# Patient Record
Sex: MALE | Race: WHITE | NOT HISPANIC OR LATINO | Employment: OTHER | URBAN - METROPOLITAN AREA
[De-identification: names, ages, dates, MRNs, and addresses within clinical notes are randomized per-mention and may not be internally consistent; named-entity substitution may affect disease eponyms.]

---
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
## 2021-05-17 ENCOUNTER — OFFICE VISIT (OUTPATIENT)
Dept: URGENT CARE | Facility: CLINIC | Age: 21
End: 2021-05-17
Payer: COMMERCIAL

## 2021-05-17 VITALS
HEIGHT: 67 IN | RESPIRATION RATE: 18 BRPM | OXYGEN SATURATION: 99 % | WEIGHT: 148 LBS | SYSTOLIC BLOOD PRESSURE: 104 MMHG | BODY MASS INDEX: 23.23 KG/M2 | HEART RATE: 85 BPM | DIASTOLIC BLOOD PRESSURE: 70 MMHG | TEMPERATURE: 98 F

## 2021-05-17 DIAGNOSIS — L03.012 PARONYCHIA OF LEFT THUMB: ICD-10-CM

## 2021-05-17 DIAGNOSIS — L03.012 CELLULITIS OF THUMB, LEFT: ICD-10-CM

## 2021-05-17 DIAGNOSIS — L03.011 CELLULITIS OF THUMB, RIGHT: Primary | ICD-10-CM

## 2021-05-17 PROCEDURE — 99214 OFFICE O/P EST MOD 30 MIN: CPT | Mod: S$GLB,,, | Performed by: PHYSICIAN ASSISTANT

## 2021-05-17 PROCEDURE — 99214 PR OFFICE/OUTPT VISIT, EST, LEVL IV, 30-39 MIN: ICD-10-PCS | Mod: S$GLB,,, | Performed by: PHYSICIAN ASSISTANT

## 2021-05-17 RX ORDER — MUPIROCIN 20 MG/G
OINTMENT TOPICAL 3 TIMES DAILY
Qty: 22 G | Refills: 0 | Status: SHIPPED | OUTPATIENT
Start: 2021-05-17 | End: 2021-05-22

## 2021-05-17 RX ORDER — SULFAMETHOXAZOLE AND TRIMETHOPRIM 800; 160 MG/1; MG/1
1 TABLET ORAL 2 TIMES DAILY
Qty: 14 TABLET | Refills: 0 | Status: SHIPPED | OUTPATIENT
Start: 2021-05-17 | End: 2021-05-24

## 2021-11-17 ENCOUNTER — OFFICE VISIT (OUTPATIENT)
Dept: URGENT CARE | Facility: CLINIC | Age: 21
End: 2021-11-17
Payer: COMMERCIAL

## 2021-11-17 VITALS
RESPIRATION RATE: 16 BRPM | SYSTOLIC BLOOD PRESSURE: 112 MMHG | BODY MASS INDEX: 21.19 KG/M2 | HEIGHT: 67 IN | HEART RATE: 72 BPM | TEMPERATURE: 99 F | OXYGEN SATURATION: 98 % | DIASTOLIC BLOOD PRESSURE: 68 MMHG | WEIGHT: 135 LBS

## 2021-11-17 DIAGNOSIS — R10.9 ABDOMINAL CRAMPING: Primary | ICD-10-CM

## 2021-11-17 PROCEDURE — 99213 PR OFFICE/OUTPT VISIT, EST, LEVL III, 20-29 MIN: ICD-10-PCS | Mod: S$GLB,,, | Performed by: PHYSICIAN ASSISTANT

## 2021-11-17 PROCEDURE — 99213 OFFICE O/P EST LOW 20 MIN: CPT | Mod: S$GLB,,, | Performed by: PHYSICIAN ASSISTANT

## 2021-11-17 RX ORDER — DICYCLOMINE HYDROCHLORIDE 10 MG/1
10 CAPSULE ORAL 3 TIMES DAILY
Qty: 21 CAPSULE | Refills: 0 | Status: SHIPPED | OUTPATIENT
Start: 2021-11-17 | End: 2021-11-24

## 2021-12-14 ENCOUNTER — OFFICE VISIT (OUTPATIENT)
Dept: UROLOGY | Facility: CLINIC | Age: 21
End: 2021-12-14
Payer: COMMERCIAL

## 2021-12-14 VITALS — BODY MASS INDEX: 21.19 KG/M2 | WEIGHT: 135 LBS | HEIGHT: 67 IN

## 2021-12-14 DIAGNOSIS — M54.50 ACUTE BILATERAL LOW BACK PAIN WITHOUT SCIATICA: Primary | ICD-10-CM

## 2021-12-14 PROCEDURE — 99203 PR OFFICE/OUTPT VISIT, NEW, LEVL III, 30-44 MIN: ICD-10-PCS | Mod: S$GLB,,, | Performed by: UROLOGY

## 2021-12-14 PROCEDURE — 99999 PR PBB SHADOW E&M-EST. PATIENT-LVL III: CPT | Mod: PBBFAC,,, | Performed by: UROLOGY

## 2021-12-14 PROCEDURE — 99203 OFFICE O/P NEW LOW 30 MIN: CPT | Mod: S$GLB,,, | Performed by: UROLOGY

## 2021-12-14 PROCEDURE — 99999 PR PBB SHADOW E&M-EST. PATIENT-LVL III: ICD-10-PCS | Mod: PBBFAC,,, | Performed by: UROLOGY

## 2023-08-23 ENCOUNTER — PATIENT MESSAGE (OUTPATIENT)
Dept: NEUROLOGY | Facility: CLINIC | Age: 23
End: 2023-08-23

## 2023-08-23 ENCOUNTER — OFFICE VISIT (OUTPATIENT)
Dept: NEUROLOGY | Facility: CLINIC | Age: 23
End: 2023-08-23
Payer: COMMERCIAL

## 2023-08-23 VITALS
BODY MASS INDEX: 22.63 KG/M2 | HEIGHT: 67 IN | SYSTOLIC BLOOD PRESSURE: 120 MMHG | HEART RATE: 90 BPM | WEIGHT: 144.19 LBS | DIASTOLIC BLOOD PRESSURE: 78 MMHG

## 2023-08-23 DIAGNOSIS — R56.9 SEIZURE: Primary | ICD-10-CM

## 2023-08-23 PROCEDURE — 99205 PR OFFICE/OUTPT VISIT, NEW, LEVL V, 60-74 MIN: ICD-10-PCS | Mod: S$GLB,,, | Performed by: PSYCHIATRY & NEUROLOGY

## 2023-08-23 PROCEDURE — 99999 PR PBB SHADOW E&M-EST. PATIENT-LVL III: ICD-10-PCS | Mod: PBBFAC,,, | Performed by: PSYCHIATRY & NEUROLOGY

## 2023-08-23 PROCEDURE — 99205 OFFICE O/P NEW HI 60 MIN: CPT | Mod: S$GLB,,, | Performed by: PSYCHIATRY & NEUROLOGY

## 2023-08-23 PROCEDURE — 99999 PR PBB SHADOW E&M-EST. PATIENT-LVL III: CPT | Mod: PBBFAC,,, | Performed by: PSYCHIATRY & NEUROLOGY

## 2023-08-23 RX ORDER — DIAZEPAM 5 MG/1
5 TABLET ORAL EVERY 12 HOURS PRN
Qty: 10 TABLET | Refills: 0 | Status: SHIPPED | OUTPATIENT
Start: 2023-08-23

## 2023-08-23 NOTE — PROGRESS NOTES
[facility] NEUROLOGY [address] [facility], [place]    Date: August 23, 2023   Patient Name: Hao Patterson   MRN: [id number]   PCP: Stefanie Groves  Referring Provider: Self, Aaareferral    Assessment:      This is Hao Patterson, 23 y.o. male with cerebral palsy       Plan:      -  No indications for seizure medication at this time.       Greater than 60 minutes spent in chart review, documentation, independent review of imaging, and face to face time with patient    I discussed side effects of the medications. I asked the patient to stop the medication if he notices serious adverse effects as we discussed and to seek immediate medical attention at an ER.     Alex Torre MD  [facility]   Department of Neurology    Subjective:        HPI:   Mr. Hao Patterson is a 23 y.o. male who presents with a chief complaint of work clearance, presents with wife who corroborates history    Patient has cerebral palsy and had initial episode of about a minute lip twitching without alteration of awareness when he was [age] years old and was started on PHB.  These continued every several months while on PHB, all consisting of focal motor activity of the face only without alteration of awareness.  The last time this occurred was at approximately age [age] and he has had no recurrence since stopping.  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    PAST MEDICAL HISTORY:  Past Medical History:   Diagnosis Date    Cerebral palsy     GERD (gastroesophageal reflux disease)     Seizures        PAST SURGICAL HISTORY:  No past surgical history on file.    CURRENT MEDS:  Current Outpatient Medications   Medication Sig Dispense Refill    diazePAM (VALIUM) 5 MG tablet Take 1 tablet (5 mg total) by mouth every 12 (twelve) hours as needed for Anxiety. 10 tablet 0    famotidine (PEPCID) 20 MG tablet Take 1 tablet (20 mg total) by mouth 2 (two) times daily.  "(Patient not taking: Reported on 12/22/2019) 30 tablet 0    fluticasone (FLONASE) 50 mcg/actuation nasal spray SHAKE LQ AND U 1 SPR IEN QD  6    mupirocin (BACTROBAN) 2 % ointment Apply to affected area 3 times daily (Patient not taking: Reported on 8/23/2023) 22 g 1    pantoprazole (PROTONIX) 40 MG tablet Take 40 mg by mouth once daily.       No current facility-administered medications for this visit.       ALLERGIES:  Review of patient's allergies indicates:   Allergen Reactions    Nut - unspecified Other (See Comments)     Never had reaction but tested + for the allergy: peanuts    Nuts [tree nut]        FAMILY HISTORY:  Family History   Problem Relation Age of Onset    Cancer Father        SOCIAL HISTORY:  Social History     Tobacco Use    Smoking status: Never    Smokeless tobacco: Never   Substance Use Topics    Alcohol use: No    Drug use: No       Review of Systems:  12 review of systems is negative except for the symptoms mentioned in HPI.        Objective:     Vitals:    08/23/23 0907   BP: 120/78   BP Location: Left arm   Patient Position: Sitting   BP Method: Large (Manual)   Pulse: 90   Weight: 65.4 kg (144 lb 2.9 oz)   Height: 5' 7" (1.702 m)       General: NAD, well nourished   Eyes: no tearing, discharge, no erythema   ENT: moist mucous membranes of the oral cavity, nares patent    Neck: Supple, full range of motion  Cardiovascular: Warm and well perfused, pulses equal and symmetrical  Lungs: Normal work of breathing, normal chest wall excursions  Skin: No rash, lesions, or breakdown on exposed skin  Psychiatry: Mood and affect are appropriate   Abdomen: soft, non tender, non distended  Extremeties: No cyanosis, clubbing or edema.    Neurological   MENTAL STATUS: Alert and oriented to person, place, and time. Attention and concentration within normal limits. Speech with mild spastic dysarthria, able to name and repeat without difficulty. Recent and remote memory within normal limits   CRANIAL " NERVES: Visual fields intact. PERRL. EOMI. Facial sensation intact. Face symmetrical. Hearing grossly intact. Full shoulder shrug bilaterally. Tongue protrudes midline   SENSORY: Sensation is intact to light touch throughout.  Negative Romberg.   MOTOR: Mild increased tone most prominent in ham strings. No pronator drift.    REFLEXES: Symmetric and 2+ throughout.   CEREBELLAR/COORDINATION/GAIT: Gait mildly unsteady with knees slightly flexed. Finger to nose intact. Normal rapid alternating movements.

## 2023-08-23 NOTE — LETTER
August 23, 2023    Hao Patterson  [address] [place] [address]  [place] [address]             [facility]  [address] [facility] [address]  Phone: [phone]  Fax: [phone] To whom it may concern,    Mr. Hao Patterson was seen today in neurology clinic.  He has a diagnosis of childhood seizures but has been without any episodes concerning for seizure while off medication for the past three years and does not have any restrictions to driving or any other activities at this time.  He is not prescribed any medications at this time.    Thank you,            Alex Torer M.D.

## 2024-01-18 ENCOUNTER — PATIENT MESSAGE (OUTPATIENT)
Dept: NEUROLOGY | Facility: CLINIC | Age: 24
End: 2024-01-18
Payer: COMMERCIAL

## 2024-02-13 ENCOUNTER — PATIENT MESSAGE (OUTPATIENT)
Dept: NEUROLOGY | Facility: CLINIC | Age: 24
End: 2024-02-13
Payer: COMMERCIAL

## 2024-04-08 ENCOUNTER — TELEPHONE (OUTPATIENT)
Dept: NEUROLOGY | Facility: CLINIC | Age: 24
End: 2024-04-08
Payer: COMMERCIAL

## 2025-04-16 ENCOUNTER — HOSPITAL ENCOUNTER (EMERGENCY)
Facility: HOSPITAL | Age: 25
Discharge: HOME | End: 2025-04-16
Attending: EMERGENCY MEDICINE
Payer: COMMERCIAL

## 2025-04-16 VITALS
OXYGEN SATURATION: 95 % | TEMPERATURE: 98 F | SYSTOLIC BLOOD PRESSURE: 107 MMHG | HEIGHT: 67 IN | WEIGHT: 135 LBS | HEART RATE: 57 BPM | DIASTOLIC BLOOD PRESSURE: 64 MMHG | BODY MASS INDEX: 21.19 KG/M2 | RESPIRATION RATE: 16 BRPM

## 2025-04-16 DIAGNOSIS — M62.838 MUSCLE SPASM: Primary | ICD-10-CM

## 2025-04-16 DIAGNOSIS — R07.89 CHEST DISCOMFORT: ICD-10-CM

## 2025-04-16 LAB
ABSOLUTE EOSINOPHIL (OHS): 0.62 K/UL
ABSOLUTE MONOCYTE (OHS): 0.91 K/UL (ref 0.3–1)
ABSOLUTE NEUTROPHIL COUNT (OHS): 3.92 K/UL (ref 1.8–7.7)
ALBUMIN SERPL BCP-MCNC: 4.1 G/DL (ref 3.5–5.2)
ALP SERPL-CCNC: 48 UNIT/L (ref 40–150)
ALT SERPL W/O P-5'-P-CCNC: 15 UNIT/L (ref 10–44)
ANION GAP (OHS): 8 MMOL/L (ref 8–16)
AST SERPL-CCNC: 17 UNIT/L (ref 11–45)
BASOPHILS # BLD AUTO: 0.07 K/UL
BASOPHILS NFR BLD AUTO: 0.8 %
BILIRUB SERPL-MCNC: 0.4 MG/DL (ref 0.1–1)
BUN SERPL-MCNC: 22 MG/DL (ref 6–20)
CALCIUM SERPL-MCNC: 9.2 MG/DL (ref 8.7–10.5)
CHLORIDE SERPL-SCNC: 105 MMOL/L (ref 95–110)
CO2 SERPL-SCNC: 25 MMOL/L (ref 23–29)
CREAT SERPL-MCNC: 0.8 MG/DL (ref 0.5–1.4)
ERYTHROCYTE [DISTWIDTH] IN BLOOD BY AUTOMATED COUNT: 11.9 % (ref 11.5–14.5)
GFR SERPLBLD CREATININE-BSD FMLA CKD-EPI: >60 ML/MIN/1.73/M2
GLUCOSE SERPL-MCNC: 92 MG/DL (ref 70–110)
HCT VFR BLD AUTO: 38.8 % (ref 40–54)
HGB BLD-MCNC: 13.5 GM/DL (ref 14–18)
IMM GRANULOCYTES # BLD AUTO: 0.02 K/UL (ref 0–0.04)
IMM GRANULOCYTES NFR BLD AUTO: 0.2 % (ref 0–0.5)
INFLUENZA A MOLECULAR (OHS): NEGATIVE
INFLUENZA B MOLECULAR (OHS): NEGATIVE
LYMPHOCYTES # BLD AUTO: 3.07 K/UL (ref 1–4.8)
MAGNESIUM SERPL-MCNC: 2.1 MG/DL (ref 1.6–2.6)
MCH RBC QN AUTO: 32 PG (ref 27–31)
MCHC RBC AUTO-ENTMCNC: 34.8 G/DL (ref 32–36)
MCV RBC AUTO: 92 FL (ref 82–98)
NUCLEATED RBC (/100WBC) (OHS): 0 /100 WBC
OHS QRS DURATION: 80 MS
OHS QTC CALCULATION: 391 MS
PLATELET # BLD AUTO: 197 K/UL (ref 150–450)
PMV BLD AUTO: 9.7 FL (ref 9.2–12.9)
POTASSIUM SERPL-SCNC: 4 MMOL/L (ref 3.5–5.1)
PROT SERPL-MCNC: 6.7 GM/DL (ref 6–8.4)
RBC # BLD AUTO: 4.22 M/UL (ref 4.6–6.2)
RELATIVE EOSINOPHIL (OHS): 7.2 %
RELATIVE LYMPHOCYTE (OHS): 35.7 % (ref 18–48)
RELATIVE MONOCYTE (OHS): 10.6 % (ref 4–15)
RELATIVE NEUTROPHIL (OHS): 45.5 % (ref 38–73)
SARS-COV-2 RDRP RESP QL NAA+PROBE: NEGATIVE
SODIUM SERPL-SCNC: 138 MMOL/L (ref 136–145)
T4 FREE SERPL-MCNC: 1.03 NG/DL (ref 0.71–1.51)
TROPONIN I SERPL HS-MCNC: <3 NG/L
TSH SERPL-ACNC: 4.17 UIU/ML (ref 0.4–4)
WBC # BLD AUTO: 8.61 K/UL (ref 3.9–12.7)

## 2025-04-16 PROCEDURE — 87502 INFLUENZA DNA AMP PROBE: CPT | Performed by: EMERGENCY MEDICINE

## 2025-04-16 PROCEDURE — 99285 EMERGENCY DEPT VISIT HI MDM: CPT | Mod: 25

## 2025-04-16 PROCEDURE — 93010 ELECTROCARDIOGRAM REPORT: CPT | Mod: ,,, | Performed by: INTERNAL MEDICINE

## 2025-04-16 PROCEDURE — 85025 COMPLETE CBC W/AUTO DIFF WBC: CPT | Performed by: EMERGENCY MEDICINE

## 2025-04-16 PROCEDURE — U0002 COVID-19 LAB TEST NON-CDC: HCPCS | Performed by: EMERGENCY MEDICINE

## 2025-04-16 PROCEDURE — 25000003 PHARM REV CODE 250: Performed by: EMERGENCY MEDICINE

## 2025-04-16 PROCEDURE — 84484 ASSAY OF TROPONIN QUANT: CPT | Performed by: EMERGENCY MEDICINE

## 2025-04-16 PROCEDURE — 84443 ASSAY THYROID STIM HORMONE: CPT | Performed by: EMERGENCY MEDICINE

## 2025-04-16 PROCEDURE — 84439 ASSAY OF FREE THYROXINE: CPT | Performed by: EMERGENCY MEDICINE

## 2025-04-16 PROCEDURE — 94761 N-INVAS EAR/PLS OXIMETRY MLT: CPT

## 2025-04-16 PROCEDURE — 80053 COMPREHEN METABOLIC PANEL: CPT | Performed by: EMERGENCY MEDICINE

## 2025-04-16 PROCEDURE — 93005 ELECTROCARDIOGRAM TRACING: CPT

## 2025-04-16 PROCEDURE — 83735 ASSAY OF MAGNESIUM: CPT | Performed by: EMERGENCY MEDICINE

## 2025-04-16 RX ORDER — CYCLOBENZAPRINE HCL 10 MG
10 TABLET ORAL
Status: COMPLETED | OUTPATIENT
Start: 2025-04-16 | End: 2025-04-16

## 2025-04-16 RX ADMIN — CYCLOBENZAPRINE 10 MG: 10 TABLET, FILM COATED ORAL at 03:04

## 2025-04-16 NOTE — DISCHARGE INSTRUCTIONS
Your labs, EKG, Chest X-ray are unremarkable today.    I recommend follow up with your neurologist and primary care doctor as needed.

## 2025-04-16 NOTE — ED PROVIDER NOTES
Encounter Date: 4/16/2025       History     Chief Complaint   Patient presents with    facial numbness     Pt states some numbness to the right side of his face that has been off and on for several months. Pt states seeing neurology for this and they cannot find a cause. Pt states the numbness has gotten worse in the last couple hours. Pt has history of cerebral palsy as well     Pt is a 26 yo M with PMH of CP, GERD, seizures who presents with some numbness and tingling, spasm to R neck and face. He notes he has had this before and been worked up however no cause was found. Pt denies weakness, ha, vision changes.    The history is provided by the patient.     Review of patient's allergies indicates:   Allergen Reactions    Nut - unspecified Other (See Comments)     Never had reaction but tested + for the allergy: peanuts    Nuts [tree nut]      Past Medical History:   Diagnosis Date    Cerebral palsy     GERD (gastroesophageal reflux disease)     Seizures      History reviewed. No pertinent surgical history.  Family History   Problem Relation Name Age of Onset    Cancer Father       Social History[1]      Physical Exam     Initial Vitals [04/16/25 0159]   BP Pulse Resp Temp SpO2   118/71 92 18 97.9 °F (36.6 °C) 96 %      MAP       --         Physical Exam    Nursing note and vitals reviewed.  Constitutional: He appears well-developed and well-nourished. He is not diaphoretic. No distress.   HENT:   Head: Normocephalic and atraumatic.   Eyes: EOM are normal.   Neck: Neck supple.   Normal range of motion.  Cardiovascular:  Normal rate and regular rhythm.           Pulmonary/Chest: No respiratory distress.   Abdominal: He exhibits no distension.   Musculoskeletal:         General: Normal range of motion.      Cervical back: Normal range of motion and neck supple.     Neurological: He is alert and oriented to person, place, and time. No cranial nerve deficit. GCS score is 15. GCS eye subscore is 4. GCS verbal subscore  is 5. GCS motor subscore is 6.   Skin: Skin is warm and dry.   Psychiatric: He has a normal mood and affect. His behavior is normal. Judgment and thought content normal.         ED Course   Procedures  Labs Reviewed   COMPREHENSIVE METABOLIC PANEL - Abnormal       Result Value    Sodium 138      Potassium 4.0      Chloride 105      CO2 25      Glucose 92      BUN 22 (*)     Creatinine 0.8      Calcium 9.2      Protein Total 6.7      Albumin 4.1      Bilirubin Total 0.4      ALP 48      AST 17      ALT 15      Anion Gap 8      eGFR >60     TSH - Abnormal    TSH 4.166 (*)    CBC WITH DIFFERENTIAL - Abnormal    WBC 8.61      RBC 4.22 (*)     HGB 13.5 (*)     HCT 38.8 (*)     MCV 92      MCH 32.0 (*)     MCHC 34.8      RDW 11.9      Platelet Count 197      MPV 9.7      Nucleated RBC 0      Neut % 45.5      Lymph % 35.7      Mono % 10.6      Eos % 7.2      Basophil % 0.8      Imm Grans % 0.2      Neut # 3.92      Lymph # 3.07      Mono # 0.91      Eos # 0.62 (*)     Baso # 0.07      Imm Grans # 0.02     INFLUENZA A & B BY MOLECULAR - Normal    INFLUENZA A MOLECULAR Negative      INFLUENZA B MOLECULAR  Negative     MAGNESIUM - Normal    Magnesium  2.1     TROPONIN I HIGH SENSITIVITY - Normal    Troponin High Sensitive <3     SARS-COV-2 RNA AMPLIFICATION, QUAL - Normal    SARS COV-2 Molecular Negative     T4, FREE - Normal    Free T4 1.03     CBC W/ AUTO DIFFERENTIAL    Narrative:     The following orders were created for panel order CBC auto differential.  Procedure                               Abnormality         Status                     ---------                               -----------         ------                     CBC with Differential[[id number]]        Abnormal            Final result                 Please view results for these tests on the individual orders.     EKG Readings: (Independently Interpreted)   Initial Reading: No STEMI. Previous EKG: Compared with most recent EKG   EKG done at 2:47 a.m. sinus  rhythm with marked sinus arrhythmia rate of 68 normal axis normal intervals compared to EKG from 11/13/2021 no significant change     ECG Results              EKG 12-lead (Final result)        Collection Time Result Time QRS Duration OHS QTC Calculation    04/16/25 02:47:47 04/16/25 09:12:17 80 391                     Final result by Interface, Lab In [facility] (04/16/25 09:12:24)                   Narrative:    Test Reason : R07.89,    Vent. Rate :  68 BPM     Atrial Rate :  68 BPM     P-R Int : 150 ms          QRS Dur :  80 ms      QT Int : 368 ms       P-R-T Axes :  84  91  77 degrees    QTcB Int : 391 ms    Sinus rhythm with marked sinus arrythmia  Rightward axis  Low septal forces ; Abnormal R wave progression in the precordial leads -  Cannot rule out Anterior infarct ,age undetermined but doubt  Abnormal ECG  When compared with ECG of 13-Nov-2021 04:21,  Vent. rate has decreased by  40 bpm  Nonspecific T wave abnormality no longer evident in Inferior leads  Nonspecific T wave abnormality no longer evident in Lateral leads  QT has shortened  Anterior forces are less  Confirmed by Cory Costa ([id number]) on 4/16/2025 9:12:14 AM    Referred By: AAAREFERRAL SELF           Confirmed By: Cory Costa                                  Imaging Results              X-Ray Chest AP Portable (Final result)  Result time 04/16/25 03:43:43      Final result by Wang Hart MD (04/16/25 03:43:43)                   Impression:      No acute findings in the chest.      Electronically signed by: Wang Hart MD  Date:    04/16/2025  Time:    03:43               Narrative:    EXAMINATION:  XR CHEST AP PORTABLE    CLINICAL HISTORY:  chest discomfort;    TECHNIQUE:  Single frontal view of the chest was performed.    COMPARISON:  11/13/2021.    FINDINGS:  No consolidation, pleural effusion or pneumothorax.    Cardiomediastinal silhouette is unremarkable.                                       Medications   cyclobenzaprine tablet 10  mg (10 mg Oral Given 4/16/25 0342)     Medical Decision Making  DDX: electrolyte abnormality, muscle spasm, dehydration, stress    Pt is neurovascularly intact  Do not think this is a stroke or seizure  Has been worked up with imaging EEG in the past  Seems most likely spasm  Labs unremarkable    Discharged to home in stable condition, return to ED warnings given, follow up and patient care instructions given.        Amount and/or Complexity of Data Reviewed  Labs: ordered.  Radiology: ordered.    Risk  Prescription drug management.                                      Clinical Impression:  Final diagnoses:  [R07.89] Chest discomfort  [M62.838] Muscle spasm (Primary)          ED Disposition Condition    Discharge Stable          ED Prescriptions    None       Follow-up Information       Follow up With Specialties Details Why Contact Info    Stefanie Groves MD Pediatrics Call  As needed [address] [facility] [address] [phone]      Alex Torre MD Neurology Call  As needed [address] [facility] [id number] [phone]                   [1]   Social History  Tobacco Use    Smoking status: Every Day     Types: Cigarettes    Smokeless tobacco: Never   Substance Use Topics    Alcohol use: Yes    Drug use: No        Nida Ghotra MD  04/20/25 0055

## 2025-04-16 NOTE — ED TRIAGE NOTES
Hao Xavier Patterson, a 25 y.o. male presents to the ED w/ complaint of right side facial numbness for a few mos but worse on tonight. Hx of CP. Being followed by neurology.    Triage note:  Chief Complaint   Patient presents with    facial numbness     Pt states some numbness to the right side of his face that has been off and on for several months. Pt states seeing neurology for this and they cannot find a cause. Pt states the numbness has gotten worse in the last couple hours. Pt has history of cerebral palsy as well     Review of patient's allergies indicates:   Allergen Reactions    Nut - unspecified Other (See Comments)     Never had reaction but tested + for the allergy: peanuts    Nuts [tree nut]      Past Medical History:   Diagnosis Date    Cerebral palsy     GERD (gastroesophageal reflux disease)     Seizures

## 2025-07-30 ENCOUNTER — HOSPITAL ENCOUNTER (EMERGENCY)
Facility: HOSPITAL | Age: 25
Discharge: HOME | End: 2025-07-30
Attending: EMERGENCY MEDICINE
Payer: COMMERCIAL

## 2025-07-30 VITALS
DIASTOLIC BLOOD PRESSURE: 66 MMHG | BODY MASS INDEX: 21.14 KG/M2 | RESPIRATION RATE: 18 BRPM | HEART RATE: 98 BPM | WEIGHT: 135 LBS | TEMPERATURE: 97 F | OXYGEN SATURATION: 97 % | SYSTOLIC BLOOD PRESSURE: 119 MMHG

## 2025-07-30 DIAGNOSIS — R53.1 WEAKNESS: ICD-10-CM

## 2025-07-30 DIAGNOSIS — E86.0 DEHYDRATION: Primary | ICD-10-CM

## 2025-07-30 LAB
ABSOLUTE EOSINOPHIL (OHS): 0.16 K/UL
ABSOLUTE MONOCYTE (OHS): 0.51 K/UL (ref 0.3–1)
ABSOLUTE NEUTROPHIL COUNT (OHS): 6.05 K/UL (ref 1.8–7.7)
ALBUMIN SERPL BCP-MCNC: 4.8 G/DL (ref 3.5–5.2)
ALP SERPL-CCNC: 44 UNIT/L (ref 40–150)
ALT SERPL W/O P-5'-P-CCNC: 26 UNIT/L (ref 10–44)
ANION GAP (OHS): 9 MMOL/L (ref 8–16)
AST SERPL-CCNC: 35 UNIT/L (ref 11–45)
BASOPHILS # BLD AUTO: 0.03 K/UL
BASOPHILS NFR BLD AUTO: 0.4 %
BILIRUB SERPL-MCNC: 1 MG/DL (ref 0.1–1)
BILIRUB UR QL STRIP.AUTO: NEGATIVE
BUN SERPL-MCNC: 12 MG/DL (ref 6–20)
CALCIUM SERPL-MCNC: 9.4 MG/DL (ref 8.7–10.5)
CHLORIDE SERPL-SCNC: 102 MMOL/L (ref 95–110)
CK SERPL-CCNC: 341 U/L (ref 20–200)
CLARITY UR: CLEAR
CO2 SERPL-SCNC: 25 MMOL/L (ref 23–29)
COLOR UR AUTO: COLORLESS
CREAT SERPL-MCNC: 0.8 MG/DL (ref 0.5–1.4)
ERYTHROCYTE [DISTWIDTH] IN BLOOD BY AUTOMATED COUNT: 11.9 % (ref 11.5–14.5)
GFR SERPLBLD CREATININE-BSD FMLA CKD-EPI: >60 ML/MIN/1.73/M2
GLUCOSE SERPL-MCNC: 132 MG/DL (ref 70–110)
GLUCOSE UR QL STRIP: NEGATIVE
HCT VFR BLD AUTO: 40.9 % (ref 40–54)
HGB BLD-MCNC: 13.9 GM/DL (ref 14–18)
HGB UR QL STRIP: NEGATIVE
IMM GRANULOCYTES # BLD AUTO: 0.01 K/UL (ref 0–0.04)
IMM GRANULOCYTES NFR BLD AUTO: 0.1 % (ref 0–0.5)
KETONES UR QL STRIP: NEGATIVE
LEUKOCYTE ESTERASE UR QL STRIP: NEGATIVE
LYMPHOCYTES # BLD AUTO: 0.27 K/UL (ref 1–4.8)
MCH RBC QN AUTO: 31.8 PG (ref 27–31)
MCHC RBC AUTO-ENTMCNC: 34 G/DL (ref 32–36)
MCV RBC AUTO: 94 FL (ref 82–98)
NITRITE UR QL STRIP: NEGATIVE
NT-PROBNP SERPL-MCNC: 97 PG/ML
NUCLEATED RBC (/100WBC) (OHS): 0 /100 WBC
PH UR STRIP: 6 [PH]
PLATELET # BLD AUTO: 170 K/UL (ref 150–450)
PMV BLD AUTO: 9.6 FL (ref 9.2–12.9)
POTASSIUM SERPL-SCNC: 3.8 MMOL/L (ref 3.5–5.1)
PROT SERPL-MCNC: 7.3 GM/DL (ref 6–8.4)
PROT UR QL STRIP: NEGATIVE
RBC # BLD AUTO: 4.37 M/UL (ref 4.6–6.2)
RELATIVE EOSINOPHIL (OHS): 2.3 %
RELATIVE LYMPHOCYTE (OHS): 3.8 % (ref 18–48)
RELATIVE MONOCYTE (OHS): 7.3 % (ref 4–15)
RELATIVE NEUTROPHIL (OHS): 86.1 % (ref 38–73)
SODIUM SERPL-SCNC: 136 MMOL/L (ref 136–145)
SP GR UR STRIP: 1.01
TROPONIN I SERPL HS-MCNC: <3 NG/L
UROBILINOGEN UR STRIP-ACNC: NEGATIVE EU/DL
WBC # BLD AUTO: 7.03 K/UL (ref 3.9–12.7)

## 2025-07-30 PROCEDURE — 93005 ELECTROCARDIOGRAM TRACING: CPT

## 2025-07-30 PROCEDURE — 81003 URINALYSIS AUTO W/O SCOPE: CPT | Performed by: PHYSICIAN ASSISTANT

## 2025-07-30 PROCEDURE — 83880 ASSAY OF NATRIURETIC PEPTIDE: CPT

## 2025-07-30 PROCEDURE — 96374 THER/PROPH/DIAG INJ IV PUSH: CPT

## 2025-07-30 PROCEDURE — 63600175 PHARM REV CODE 636 W HCPCS: Mod: JZ,TB

## 2025-07-30 PROCEDURE — 84484 ASSAY OF TROPONIN QUANT: CPT

## 2025-07-30 PROCEDURE — 82310 ASSAY OF CALCIUM: CPT | Performed by: PHYSICIAN ASSISTANT

## 2025-07-30 PROCEDURE — 96361 HYDRATE IV INFUSION ADD-ON: CPT

## 2025-07-30 PROCEDURE — 99284 EMERGENCY DEPT VISIT MOD MDM: CPT | Mod: 25

## 2025-07-30 PROCEDURE — 82550 ASSAY OF CK (CPK): CPT | Performed by: PHYSICIAN ASSISTANT

## 2025-07-30 PROCEDURE — 85025 COMPLETE CBC W/AUTO DIFF WBC: CPT | Performed by: PHYSICIAN ASSISTANT

## 2025-07-30 PROCEDURE — 93010 ELECTROCARDIOGRAM REPORT: CPT | Mod: ,,, | Performed by: INTERNAL MEDICINE

## 2025-07-30 PROCEDURE — 25000003 PHARM REV CODE 250

## 2025-07-30 RX ORDER — KETOROLAC TROMETHAMINE 30 MG/ML
15 INJECTION, SOLUTION INTRAMUSCULAR; INTRAVENOUS
Status: DISCONTINUED | OUTPATIENT
Start: 2025-07-30 | End: 2025-07-30

## 2025-07-30 RX ORDER — KETOROLAC TROMETHAMINE 30 MG/ML
15 INJECTION, SOLUTION INTRAMUSCULAR; INTRAVENOUS
Status: COMPLETED | OUTPATIENT
Start: 2025-07-30 | End: 2025-07-30

## 2025-07-30 RX ORDER — IBUPROFEN 600 MG/1
600 TABLET, FILM COATED ORAL EVERY 6 HOURS PRN
Qty: 20 TABLET | Refills: 0 | Status: SHIPPED | OUTPATIENT
Start: 2025-07-30

## 2025-07-30 RX ORDER — ACETAMINOPHEN 325 MG/1
650 TABLET ORAL
Status: COMPLETED | OUTPATIENT
Start: 2025-07-30 | End: 2025-07-30

## 2025-07-30 RX ADMIN — SODIUM CHLORIDE 1000 ML: 9 INJECTION, SOLUTION INTRAVENOUS at 03:07

## 2025-07-30 RX ADMIN — KETOROLAC TROMETHAMINE 15 MG: 30 INJECTION, SOLUTION INTRAMUSCULAR; INTRAVENOUS at 05:07

## 2025-07-30 RX ADMIN — ACETAMINOPHEN 650 MG: 325 TABLET ORAL at 02:07

## 2025-07-30 NOTE — ED PROVIDER NOTES
"Encounter Date: 7/30/2025    SCRIBE #1 NOTE: I, Jamila Shearer, am scribing for, and in the presence of,  Sharon Vazquez PA-C. I have scribed the following portions of the note - Other sections scribed: HPI, ROS, PE.       History     Chief Complaint   Patient presents with    Weakness     Patient reports generalized weakness that started today while at work. Reports feeling "dehydrated."       Hao Patterson is a 25 y.o. male, with a PMHx of GERD, epilepsy, cerebral palsy who presents to the ED with generalized weakness that began at work earlier today. 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 He states he has been drinking water and ate two cans of chris sausage today. Patient reports associated lightheadedness and dizziness while standing, intermittent shortness of breath, mouth numbness, nausea, back pain, headache (6/10), and dysuria (once yesterday). He also reports loose stools which he states he normally has at least once per week. He denies attempted treatment PTA. Patient reports he has not had a seizure recently. He states he sometimes feels like he is about to have a seizure but doesn't. He reports he saw a neurologist one year ago who did a brain scan with unremarkable findings. No other exacerbating or alleviating factors. Denies chest pain, abdominal pain, vision changes, sore throat, vomiting, cough, fever, rhinorrhea or other associated symptoms.       The history is provided by the patient. No  was used.     Review of patient's allergies indicates:   Allergen Reactions    Nut - unspecified Other (See Comments)     Never had reaction but tested + for the allergy: peanuts    Nuts [tree nut]      Past Medical History:   Diagnosis Date    Cerebral palsy     GERD (gastroesophageal reflux disease)     Seizures      History reviewed. No pertinent surgical history.  Family History   Problem Relation Name Age of Onset    Cancer Father   "     Social History[1]  Review of Systems   Constitutional:  Negative for fever.   HENT:  Negative for rhinorrhea and sore throat.    Eyes:  Negative for visual disturbance.   Respiratory:  Positive for shortness of breath (intermittent). Negative for cough.    Cardiovascular:  Negative for chest pain.   Gastrointestinal:  Positive for diarrhea (normal) and nausea. Negative for abdominal pain and vomiting.   Genitourinary:  Positive for dysuria (once yesterday).   Musculoskeletal:  Positive for back pain.   Neurological:  Positive for dizziness (while standing), weakness, light-headedness (while standing), numbness (mouth) and headaches (6/10).       Physical Exam     Initial Vitals [07/30/25 1346]   BP Pulse Resp Temp SpO2   (!) 145/79 (!) 115 18 97.4 °F (36.3 °C) 100 %      MAP       --         Physical Exam    Nursing note and vitals reviewed.  Constitutional: He appears well-developed and well-nourished. He is not diaphoretic. No distress.   HENT:   Head: Normocephalic and atraumatic.   Right Ear: External ear normal.   Left Ear: External ear normal.   Nose: Nose normal. Mouth/Throat: Uvula is midline, oropharynx is clear and moist and mucous membranes are normal. Mucous membranes are not pale, not dry and not cyanotic. No oropharyngeal exudate, posterior oropharyngeal edema, posterior oropharyngeal erythema or tonsillar abscesses.   Eyes: Conjunctivae and EOM are normal.   Neck: Neck supple.   Normal range of motion.  Cardiovascular:  Normal rate, regular rhythm, normal heart sounds and intact distal pulses.     Exam reveals no gallop and no friction rub.       No murmur heard.  Patient has 2+ RP equally and bilaterally.   Pulmonary/Chest: Breath sounds normal. No stridor. No respiratory distress. He has no wheezes. He has no rhonchi. He has no rales. He exhibits no tenderness.   Abdominal: Abdomen is soft. Bowel sounds are normal. He exhibits no distension. There is no abdominal tenderness.   Musculoskeletal:          General: Normal range of motion.      Cervical back: Normal range of motion and neck supple.     Neurological: He is alert and oriented to person, place, and time.   Skin: Skin is warm and dry. Capillary refill takes less than 2 seconds. No rash noted.   Psychiatric: He has a normal mood and affect. Thought content normal.         ED Course   Procedures  Labs Reviewed   COMPREHENSIVE METABOLIC PANEL - Abnormal       Result Value    Sodium 136      Potassium 3.8      Chloride 102      CO2 25      Glucose 132 (*)     BUN 12      Creatinine 0.8      Calcium 9.4      Protein Total 7.3      Albumin 4.8      Bilirubin Total 1.0      ALP 44      AST 35      ALT 26      Anion Gap 9      eGFR >60     CK - Abnormal     (*)    URINALYSIS, REFLEX TO URINE CULTURE - Abnormal    Color, UA Colorless (*)     Appearance, UA Clear      pH, UA 6.0      Spec Grav UA 1.010      Protein, UA Negative      Glucose, UA Negative      Ketones, UA Negative      Bilirubin, UA Negative      Blood, UA Negative      Nitrites, UA Negative      Urobilinogen, UA Negative      Leukocyte Esterase, UA Negative     CBC WITH DIFFERENTIAL - Abnormal    WBC 7.03      RBC 4.37 (*)     HGB 13.9 (*)     HCT 40.9      MCV 94      MCH 31.8 (*)     MCHC 34.0      RDW 11.9      Platelet Count 170      MPV 9.6      Nucleated RBC 0      Neut % 86.1 (*)     Lymph % 3.8 (*)     Mono % 7.3      Eos % 2.3      Basophil % 0.4      Imm Grans % 0.1      Neut # 6.05      Lymph # 0.27 (*)     Mono # 0.51      Eos # 0.16      Baso # 0.03      Imm Grans # 0.01     TROPONIN I HIGH SENSITIVITY - Normal    Troponin High Sensitive <3     NT-PRO NATRIURETIC PEPTIDE - Normal    NT-proBNP 97      Narrative:     NOTE:  Access complete set of age - and/or gender-specific reference intervals for this test in the [facility] Collection Manual.   CBC W/ AUTO DIFFERENTIAL    Narrative:     The following orders were created for panel order CBC auto  differential.  Procedure                               Abnormality         Status                     ---------                               -----------         ------                     CBC with Differential[[id number]]       Abnormal            Final result                 Please view results for these tests on the individual orders.   GREY TOP URINE HOLD     EKG Readings: (Independently Interpreted)   Sinus tachycardia 107 beats per minute, rightward axis,  milliseconds.  No STEMI       Imaging Results    None          Medications   acetaminophen tablet 650 mg (650 mg Oral Given 7/30/25 1437)   sodium chloride 0.9% bolus 1,000 mL 1,000 mL (0 mLs Intravenous Stopped 7/30/25 1726)   ketorolac injection 15 mg (15 mg Intravenous Given 7/30/25 1730)     Medical Decision Making  Hao Patterson is a 25 y.o. male, with a PMHx of GERD, epilepsy, cerebral palsy who presents to the ED with generalized weakness that began at work earlier today.     Differential includes but not limited to dehydration, orthostatic hypotension, ACS however less likely due to lack of acute chest pain or shortness of breath, arrhythmia, viral syndrome.    Patient is alert and afebrile.  Patient is nontoxic appearing, not in distress. On physical exam patient ambulating without difficulty.  Lungs are clear to auscultation.  Patient is speaking in full sentences without difficulty.  Normal heart rate and rhythm.  No posterior oropharyngeal erythema, tonsillar swelling, tonsillar exudates.  Uvula is midline.  Abdomen is soft and nondistended.  No abdominal tenderness rebound or guarding.  Negative McBurney's or Oseguera's sign.  No signs of fluid overload.  No leg edema.  Strong distal radial pulse bilaterally.  Patient is is present bedside, who states he is at baseline.    CBC unremarkable for acute infection or anemia.  CMP unremarkable for acute electrolyte abnormality, acute kidney or liver injury.  CPK mildly elevated 341.   Troponin within normal limits thus less concerning for ACS.  BNP within normal limits thus less concerning for CHF.  Urinalysis unremarkable for acute dehydration or infection.  Orthostatics unremarkable for orthostatic hypotension.  Patient given IV fluids, Tylenol, and Toradol.  Upon re-evaluation tachycardia has improved. Patient report weakness beginning to improve. Discussed ibuprofen for headache at home. Advised to maintain oral hydration with water. Strict return precautions given for new or worsening symptoms.  Recommended follow up with PCP in 2 days.  Patient is in agreeance to this plan, and expresses understanding return precautions and follow up plan.  I thoroughly answered all patient's questions and concerns. Vitals are reassuring.  Patient is stable for discharge at this time.    Amount and/or Complexity of Data Reviewed  Labs: ordered. Decision-making details documented in ED Course.  ECG/medicine tests: ordered and independent interpretation performed. Decision-making details documented in ED Course.    Risk  OTC drugs.  Prescription drug management.            Scribe Attestation:   Scribe #1: I performed the above scribed service and the documentation accurately describes the services I performed. I attest to the accuracy of the note.                                   Clinical Impression:  Final diagnoses:  [R53.1] Weakness  [E86.0] Dehydration (Primary)          ED Disposition Condition    Discharge Stable          ED Prescriptions       Medication Sig Dispense Start Date End Date Auth. Provider    ibuprofen (ADVIL,MOTRIN) 600 MG tablet Take 1 tablet (600 mg total) by mouth every 6 (six) hours as needed for Pain. 20 tablet 7/30/2025 -- Sharon Vazquez PA-C          Follow-up Information       Follow up With Specialties Details Why Contact Info    Stefanie Groves MD Pediatrics Schedule an appointment as soon as possible for a visit  For follow up [address] [place] [address]  [id number]  [phone]      [facility] Emergency Dept Emergency Medicine Go to  If new symptoms develop or symptoms worsen [address] [facility] [place] [address] [phone]          I, Sharon Vazquez PA-C, personally performed the services described in this documentation. All medical record entries made by the scribe were at my direction and in my presence. I have reviewed the chart and agree that the record reflects my personal performance and is accurate and complete.    [employer], a voice recognition system was utilized in creating the note.           [1]   Social History  Tobacco Use    Smoking status: Every Day     Types: Cigarettes    Smokeless tobacco: Never   Substance Use Topics    Alcohol use: Yes    Drug use: No        Sharon Vazquez PA-C  07/30/25 9423

## 2025-07-30 NOTE — DISCHARGE INSTRUCTIONS
Maintain oral hydration with water. Take tylenol or ibuprofen for headache.     Thank you for coming to our Emergency Department today. It is important to remember that some problems or medical conditions are difficult to diagnose and may not be found or addressed during your Emergency Department visit.  These conditions often start with non-specific symptoms and can only be diagnosed on follow up visits with your primary care physician or specialist when the symptoms continue or change. Please remember that all medical conditions can change, and we cannot predict how you will be feeling tomorrow or the next day. Return to the ER with any questions/concerns, new/concerning symptoms, worsening or failure to improve.       Be sure to follow up with your primary care doctor and review all labs/imaging/tests that were performed during your ER visit with them. It is very common for us to identify non-emergent incidental findings which must be followed up with your primary care physician.  Some labs/imaging/tests may be outside of the normal range, and require non-emergent follow-up and/or further investigation/treatment/procedures/testing to help diagnose/exclude/prevent complications or other potentially serious medical conditions. Some abnormalities may not have been discussed or addressed during your ER visit.     An ER visit does not replace a primary care visit, and many screening tests or follow-up tests cannot be ordered by an ER doctor or performed by the ER. Some tests may even require pre-approval.    If you do not have a primary care doctor, you may contact the one listed on your discharge paperwork or you may also call the [facility] Appointment Desk at [phone] , or [address] [place] [address] at  [phone] to schedule an appointment, or establish care with a primary care doctor or even a specialist and to obtain information about local resources. It is important to your health that you have a primary care  doctor.    Please take all medications as directed. We have done our best to select a medication for you that will treat your condition however, all medications may potentially have side-effects and it is impossible to predict which medications may give you side-effects or what those side-effects (if any) those medications may give you.  If you feel that you are having a negative effect or side-effect of any medication you should stop taking those medications immediately and seek medical attention. If you feel that you are having a life-threatening reaction call 911.        Do not drive, swim, climb to height, take a bath, operate heavy machinery, drink alcohol or take potentially sedating medications, sign any legal documents or make any important decisions for 24 hours if you have received any pain medications, sedatives or mood altering drugs during your ER visit or within 24 hours of taking them if they have been prescribed to you.     You can find additional resources for Dentists, hearing aids, durable medical equipment, low cost pharmacies and other resources at [email or web address]

## 2025-07-30 NOTE — ED NOTES
24 y/o male report to the ED due to SOB, fatigue, HA, dizziness and nausea which started today. Pt states he was working in the heat when sx started.

## 2025-07-31 LAB
HOLD SPECIMEN: NORMAL
OHS QRS DURATION: 80 MS
OHS QTC CALCULATION: 411 MS